# Patient Record
Sex: FEMALE | Race: WHITE | NOT HISPANIC OR LATINO | URBAN - METROPOLITAN AREA
[De-identification: names, ages, dates, MRNs, and addresses within clinical notes are randomized per-mention and may not be internally consistent; named-entity substitution may affect disease eponyms.]

---

## 2017-07-28 ENCOUNTER — EMERGENCY (EMERGENCY)
Facility: HOSPITAL | Age: 47
LOS: 1 days | Discharge: HOME | End: 2017-07-28
Attending: EMERGENCY MEDICINE
Payer: MEDICAID

## 2017-07-28 VITALS
DIASTOLIC BLOOD PRESSURE: 69 MMHG | WEIGHT: 149.91 LBS | HEIGHT: 64 IN | SYSTOLIC BLOOD PRESSURE: 112 MMHG | OXYGEN SATURATION: 98 % | RESPIRATION RATE: 20 BRPM | TEMPERATURE: 98 F | HEART RATE: 67 BPM

## 2017-07-28 DIAGNOSIS — G43.909 MIGRAINE, UNSPECIFIED, NOT INTRACTABLE, WITHOUT STATUS MIGRAINOSUS: ICD-10-CM

## 2017-07-28 DIAGNOSIS — R42 DIZZINESS AND GIDDINESS: ICD-10-CM

## 2017-07-28 DIAGNOSIS — Z88.0 ALLERGY STATUS TO PENICILLIN: ICD-10-CM

## 2017-07-28 DIAGNOSIS — E03.9 HYPOTHYROIDISM, UNSPECIFIED: ICD-10-CM

## 2017-07-28 PROCEDURE — 99284 EMERGENCY DEPT VISIT MOD MDM: CPT | Mod: 25

## 2017-07-28 RX ORDER — SODIUM CHLORIDE 9 MG/ML
1000 INJECTION INTRAMUSCULAR; INTRAVENOUS; SUBCUTANEOUS ONCE
Qty: 0 | Refills: 0 | Status: COMPLETED | OUTPATIENT
Start: 2017-07-28 | End: 2017-07-28

## 2017-07-28 RX ORDER — METOCLOPRAMIDE HCL 10 MG
10 TABLET ORAL ONCE
Qty: 0 | Refills: 0 | Status: COMPLETED | OUTPATIENT
Start: 2017-07-28 | End: 2017-07-28

## 2017-07-28 RX ORDER — MECLIZINE HCL 12.5 MG
25 TABLET ORAL ONCE
Qty: 0 | Refills: 0 | Status: COMPLETED | OUTPATIENT
Start: 2017-07-28 | End: 2017-07-28

## 2017-07-28 RX ORDER — DIPHENHYDRAMINE HCL 50 MG
25 CAPSULE ORAL ONCE
Qty: 0 | Refills: 0 | Status: COMPLETED | OUTPATIENT
Start: 2017-07-28 | End: 2017-07-28

## 2017-07-28 NOTE — ED PROVIDER NOTE - PROGRESS NOTE DETAILS
Endorsed to Dr. Luis, awaiting work up. labs unremarkable  on reeval patient reports resolution of HA after meds. Pt well-appearing, stable for discharge.

## 2017-07-28 NOTE — ED PROVIDER NOTE - OBJECTIVE STATEMENT
48 y/o F h/o migraine presenting for dizziness described as room spinning worse when patient lies down or with position change. Also reports nausea with vomiting.  Afebrile, no chest pain, no trauma, no abdominal pain.

## 2017-07-28 NOTE — ED PROVIDER NOTE - MEDICAL DECISION MAKING DETAILS
46 y/o F w headache and dizziness. Headache has been daily and feels similar to previous headaches, however today pt with dizziness, n/v. Neurologically intact w normal vitals. Labs, benadryl, reglan, meclizine, will reassess

## 2017-07-28 NOTE — ED PROVIDER NOTE - CARE PLAN
Principal Discharge DX:	Dizziness  Secondary Diagnosis:	Migraine without status migrainosus, not intractable, unspecified migraine type

## 2017-07-29 LAB
ANION GAP SERPL CALC-SCNC: 8 MMOL/L — SIGNIFICANT CHANGE UP (ref 5–17)
BASOPHILS # BLD AUTO: 0.1 K/UL — SIGNIFICANT CHANGE UP (ref 0–0.2)
BASOPHILS NFR BLD AUTO: 0.8 % — SIGNIFICANT CHANGE UP (ref 0–2)
BUN SERPL-MCNC: 9 MG/DL — SIGNIFICANT CHANGE UP (ref 7–18)
CALCIUM SERPL-MCNC: 8.6 MG/DL — SIGNIFICANT CHANGE UP (ref 8.4–10.5)
CHLORIDE SERPL-SCNC: 105 MMOL/L — SIGNIFICANT CHANGE UP (ref 96–108)
CO2 SERPL-SCNC: 28 MMOL/L — SIGNIFICANT CHANGE UP (ref 22–31)
CREAT SERPL-MCNC: 0.58 MG/DL — SIGNIFICANT CHANGE UP (ref 0.5–1.3)
EOSINOPHIL # BLD AUTO: 0.2 K/UL — SIGNIFICANT CHANGE UP (ref 0–0.5)
EOSINOPHIL NFR BLD AUTO: 2.3 % — SIGNIFICANT CHANGE UP (ref 0–6)
GLUCOSE SERPL-MCNC: 106 MG/DL — HIGH (ref 70–99)
HCT VFR BLD CALC: 37.5 % — SIGNIFICANT CHANGE UP (ref 34.5–45)
HGB BLD-MCNC: 12.8 G/DL — SIGNIFICANT CHANGE UP (ref 11.5–15.5)
LYMPHOCYTES # BLD AUTO: 1.5 K/UL — SIGNIFICANT CHANGE UP (ref 1–3.3)
LYMPHOCYTES # BLD AUTO: 16.5 % — SIGNIFICANT CHANGE UP (ref 13–44)
MCHC RBC-ENTMCNC: 30.6 PG — SIGNIFICANT CHANGE UP (ref 27–34)
MCHC RBC-ENTMCNC: 34.3 GM/DL — SIGNIFICANT CHANGE UP (ref 32–36)
MCV RBC AUTO: 89.1 FL — SIGNIFICANT CHANGE UP (ref 80–100)
MONOCYTES # BLD AUTO: 0.6 K/UL — SIGNIFICANT CHANGE UP (ref 0–0.9)
MONOCYTES NFR BLD AUTO: 6.6 % — SIGNIFICANT CHANGE UP (ref 2–14)
NEUTROPHILS # BLD AUTO: 6.7 K/UL — SIGNIFICANT CHANGE UP (ref 1.8–7.4)
NEUTROPHILS NFR BLD AUTO: 73.9 % — SIGNIFICANT CHANGE UP (ref 43–77)
PLATELET # BLD AUTO: 347 K/UL — SIGNIFICANT CHANGE UP (ref 150–400)
POTASSIUM SERPL-MCNC: 4.1 MMOL/L — SIGNIFICANT CHANGE UP (ref 3.5–5.3)
POTASSIUM SERPL-SCNC: 4.1 MMOL/L — SIGNIFICANT CHANGE UP (ref 3.5–5.3)
RBC # BLD: 4.21 M/UL — SIGNIFICANT CHANGE UP (ref 3.8–5.2)
RBC # FLD: 12.2 % — SIGNIFICANT CHANGE UP (ref 10.3–14.5)
SODIUM SERPL-SCNC: 141 MMOL/L — SIGNIFICANT CHANGE UP (ref 135–145)
WBC # BLD: 9 K/UL — SIGNIFICANT CHANGE UP (ref 3.8–10.5)
WBC # FLD AUTO: 9 K/UL — SIGNIFICANT CHANGE UP (ref 3.8–10.5)

## 2017-07-29 PROCEDURE — 36415 COLL VENOUS BLD VENIPUNCTURE: CPT

## 2017-07-29 PROCEDURE — 96374 THER/PROPH/DIAG INJ IV PUSH: CPT

## 2017-07-29 PROCEDURE — 85027 COMPLETE CBC AUTOMATED: CPT

## 2017-07-29 PROCEDURE — 96375 TX/PRO/DX INJ NEW DRUG ADDON: CPT

## 2017-07-29 PROCEDURE — 99284 EMERGENCY DEPT VISIT MOD MDM: CPT | Mod: 25

## 2017-07-29 PROCEDURE — 80048 BASIC METABOLIC PNL TOTAL CA: CPT

## 2017-07-29 RX ADMIN — SODIUM CHLORIDE 1000 MILLILITER(S): 9 INJECTION INTRAMUSCULAR; INTRAVENOUS; SUBCUTANEOUS at 01:32

## 2017-07-29 RX ADMIN — Medication 25 MILLIGRAM(S): at 01:33

## 2017-07-29 RX ADMIN — Medication 10 MILLIGRAM(S): at 01:32

## 2017-07-29 RX ADMIN — Medication 101 MILLIGRAM(S): at 01:32

## 2017-07-29 NOTE — ED ADULT NURSE NOTE - GENITOURINARY ASSESSMENT
Reason for visit: Annual     Last Pap: 16 Neg Hpv Neg  Last Mammo: 17 Benign  Last Dexa: none  Last Td/Tdap: 3/22/11  Gardasil: N/A  Colon: none  Lipids: 9/15/16 Chol 162 Tri 75 HDL 79 LDL 68  Obstetric History       T2      L3     SAB0   TAB0   Ectopic0   Molar0   Multiple0   Live Births3      History   Smoking Status   • Former Smoker   • Packs/day: 0.50   • Years: 10.00   • Types: Cigarettes   • Quit date: 10/15/2004   Smokeless Tobacco   • Never Used     C/o: Patient is here for her annual exam.  Patient is not using NuvaRing at this time. Patient has not had menses since December. Patient thought that menses would come after a while but still has not gotten it. She doesn't mind not having menses but thought she should mention that today. Patient is not currently having sex. Patient has no other concerns today.   Denies known Latex allergy or symptoms of Latex sensitivity.  Medications reviewed and updated.        WDL

## 2019-05-28 ENCOUNTER — TRANSCRIPTION ENCOUNTER (OUTPATIENT)
Age: 49
End: 2019-05-28

## 2022-10-23 ENCOUNTER — INPATIENT (INPATIENT)
Facility: HOSPITAL | Age: 52
LOS: 1 days | Discharge: HOME | DRG: 149 | End: 2022-10-25
Attending: HOSPITALIST | Admitting: HOSPITALIST
Payer: COMMERCIAL

## 2022-10-23 VITALS
OXYGEN SATURATION: 100 % | HEART RATE: 86 BPM | RESPIRATION RATE: 18 BRPM | SYSTOLIC BLOOD PRESSURE: 145 MMHG | TEMPERATURE: 98 F | HEIGHT: 64 IN | WEIGHT: 169.98 LBS | DIASTOLIC BLOOD PRESSURE: 78 MMHG

## 2022-10-23 DIAGNOSIS — Z90.49 ACQUIRED ABSENCE OF OTHER SPECIFIED PARTS OF DIGESTIVE TRACT: Chronic | ICD-10-CM

## 2022-10-23 DIAGNOSIS — R42 DIZZINESS AND GIDDINESS: ICD-10-CM

## 2022-10-23 DIAGNOSIS — Z29.9 ENCOUNTER FOR PROPHYLACTIC MEASURES, UNSPECIFIED: ICD-10-CM

## 2022-10-23 DIAGNOSIS — E03.9 HYPOTHYROIDISM, UNSPECIFIED: ICD-10-CM

## 2022-10-23 LAB
ALBUMIN SERPL ELPH-MCNC: 3.7 G/DL — SIGNIFICANT CHANGE UP (ref 3.5–5)
ALP SERPL-CCNC: 72 U/L — SIGNIFICANT CHANGE UP (ref 40–120)
ALT FLD-CCNC: 36 U/L DA — SIGNIFICANT CHANGE UP (ref 10–60)
ANION GAP SERPL CALC-SCNC: 12 MMOL/L — SIGNIFICANT CHANGE UP (ref 5–17)
AST SERPL-CCNC: 17 U/L — SIGNIFICANT CHANGE UP (ref 10–40)
BASOPHILS # BLD AUTO: 0.03 K/UL — SIGNIFICANT CHANGE UP (ref 0–0.2)
BASOPHILS NFR BLD AUTO: 0.2 % — SIGNIFICANT CHANGE UP (ref 0–2)
BILIRUB SERPL-MCNC: 0.3 MG/DL — SIGNIFICANT CHANGE UP (ref 0.2–1.2)
BUN SERPL-MCNC: 13 MG/DL — SIGNIFICANT CHANGE UP (ref 7–18)
CALCIUM SERPL-MCNC: 9.7 MG/DL — SIGNIFICANT CHANGE UP (ref 8.4–10.5)
CHLORIDE SERPL-SCNC: 106 MMOL/L — SIGNIFICANT CHANGE UP (ref 96–108)
CO2 SERPL-SCNC: 23 MMOL/L — SIGNIFICANT CHANGE UP (ref 22–31)
CREAT SERPL-MCNC: 0.72 MG/DL — SIGNIFICANT CHANGE UP (ref 0.5–1.3)
EGFR: 99 ML/MIN/1.73M2 — SIGNIFICANT CHANGE UP
EOSINOPHIL # BLD AUTO: 0.01 K/UL — SIGNIFICANT CHANGE UP (ref 0–0.5)
EOSINOPHIL NFR BLD AUTO: 0.1 % — SIGNIFICANT CHANGE UP (ref 0–6)
FLUAV AG NPH QL: SIGNIFICANT CHANGE UP
FLUBV AG NPH QL: SIGNIFICANT CHANGE UP
GLUCOSE SERPL-MCNC: 120 MG/DL — HIGH (ref 70–99)
HCT VFR BLD CALC: 43.4 % — SIGNIFICANT CHANGE UP (ref 34.5–45)
HGB BLD-MCNC: 14.3 G/DL — SIGNIFICANT CHANGE UP (ref 11.5–15.5)
IMM GRANULOCYTES NFR BLD AUTO: 0.4 % — SIGNIFICANT CHANGE UP (ref 0–0.9)
LYMPHOCYTES # BLD AUTO: 0.96 K/UL — LOW (ref 1–3.3)
LYMPHOCYTES # BLD AUTO: 6.7 % — LOW (ref 13–44)
MCHC RBC-ENTMCNC: 28.7 PG — SIGNIFICANT CHANGE UP (ref 27–34)
MCHC RBC-ENTMCNC: 32.9 GM/DL — SIGNIFICANT CHANGE UP (ref 32–36)
MCV RBC AUTO: 87 FL — SIGNIFICANT CHANGE UP (ref 80–100)
MONOCYTES # BLD AUTO: 0.27 K/UL — SIGNIFICANT CHANGE UP (ref 0–0.9)
MONOCYTES NFR BLD AUTO: 1.9 % — LOW (ref 2–14)
NEUTROPHILS # BLD AUTO: 12.99 K/UL — HIGH (ref 1.8–7.4)
NEUTROPHILS NFR BLD AUTO: 90.7 % — HIGH (ref 43–77)
NRBC # BLD: 0 /100 WBCS — SIGNIFICANT CHANGE UP (ref 0–0)
PLATELET # BLD AUTO: 412 K/UL — HIGH (ref 150–400)
POTASSIUM SERPL-MCNC: 3.7 MMOL/L — SIGNIFICANT CHANGE UP (ref 3.5–5.3)
POTASSIUM SERPL-SCNC: 3.7 MMOL/L — SIGNIFICANT CHANGE UP (ref 3.5–5.3)
PROT SERPL-MCNC: 8.5 G/DL — HIGH (ref 6–8.3)
RBC # BLD: 4.99 M/UL — SIGNIFICANT CHANGE UP (ref 3.8–5.2)
RBC # FLD: 13.3 % — SIGNIFICANT CHANGE UP (ref 10.3–14.5)
SARS-COV-2 RNA SPEC QL NAA+PROBE: SIGNIFICANT CHANGE UP
SODIUM SERPL-SCNC: 141 MMOL/L — SIGNIFICANT CHANGE UP (ref 135–145)
WBC # BLD: 14.32 K/UL — HIGH (ref 3.8–10.5)
WBC # FLD AUTO: 14.32 K/UL — HIGH (ref 3.8–10.5)

## 2022-10-23 PROCEDURE — 99233 SBSQ HOSP IP/OBS HIGH 50: CPT | Mod: GC

## 2022-10-23 PROCEDURE — 70450 CT HEAD/BRAIN W/O DYE: CPT | Mod: 26,MA

## 2022-10-23 PROCEDURE — 99285 EMERGENCY DEPT VISIT HI MDM: CPT

## 2022-10-23 RX ORDER — METOCLOPRAMIDE HCL 10 MG
10 TABLET ORAL ONCE
Refills: 0 | Status: COMPLETED | OUTPATIENT
Start: 2022-10-23 | End: 2022-10-23

## 2022-10-23 RX ORDER — ACETAMINOPHEN 500 MG
650 TABLET ORAL EVERY 6 HOURS
Refills: 0 | Status: DISCONTINUED | OUTPATIENT
Start: 2022-10-23 | End: 2022-10-25

## 2022-10-23 RX ORDER — SODIUM CHLORIDE 9 MG/ML
1000 INJECTION INTRAMUSCULAR; INTRAVENOUS; SUBCUTANEOUS ONCE
Refills: 0 | Status: COMPLETED | OUTPATIENT
Start: 2022-10-23 | End: 2022-10-23

## 2022-10-23 RX ORDER — MECLIZINE HCL 12.5 MG
25 TABLET ORAL EVERY 6 HOURS
Refills: 0 | Status: DISCONTINUED | OUTPATIENT
Start: 2022-10-23 | End: 2022-10-25

## 2022-10-23 RX ORDER — LANOLIN ALCOHOL/MO/W.PET/CERES
3 CREAM (GRAM) TOPICAL AT BEDTIME
Refills: 0 | Status: DISCONTINUED | OUTPATIENT
Start: 2022-10-23 | End: 2022-10-25

## 2022-10-23 RX ORDER — ONDANSETRON 8 MG/1
4 TABLET, FILM COATED ORAL EVERY 8 HOURS
Refills: 0 | Status: DISCONTINUED | OUTPATIENT
Start: 2022-10-23 | End: 2022-10-25

## 2022-10-23 RX ORDER — ONDANSETRON 8 MG/1
4 TABLET, FILM COATED ORAL ONCE
Refills: 0 | Status: COMPLETED | OUTPATIENT
Start: 2022-10-23 | End: 2022-10-23

## 2022-10-23 RX ORDER — LEVOTHYROXINE SODIUM 125 MCG
100 TABLET ORAL
Refills: 0 | Status: DISCONTINUED | OUTPATIENT
Start: 2022-10-23 | End: 2022-10-25

## 2022-10-23 RX ORDER — MECLIZINE HCL 12.5 MG
50 TABLET ORAL ONCE
Refills: 0 | Status: COMPLETED | OUTPATIENT
Start: 2022-10-23 | End: 2022-10-23

## 2022-10-23 RX ORDER — LEVOTHYROXINE SODIUM 125 MCG
88 TABLET ORAL
Refills: 0 | Status: DISCONTINUED | OUTPATIENT
Start: 2022-10-23 | End: 2022-10-25

## 2022-10-23 RX ORDER — SODIUM CHLORIDE 9 MG/ML
1000 INJECTION INTRAMUSCULAR; INTRAVENOUS; SUBCUTANEOUS
Refills: 0 | Status: DISCONTINUED | OUTPATIENT
Start: 2022-10-23 | End: 2022-10-25

## 2022-10-23 RX ORDER — ENOXAPARIN SODIUM 100 MG/ML
40 INJECTION SUBCUTANEOUS EVERY 24 HOURS
Refills: 0 | Status: DISCONTINUED | OUTPATIENT
Start: 2022-10-23 | End: 2022-10-25

## 2022-10-23 RX ORDER — ALBUTEROL 90 UG/1
2 AEROSOL, METERED ORAL EVERY 6 HOURS
Refills: 0 | Status: DISCONTINUED | OUTPATIENT
Start: 2022-10-23 | End: 2022-10-25

## 2022-10-23 RX ORDER — METOCLOPRAMIDE HCL 10 MG
1 TABLET ORAL
Qty: 0 | Refills: 0 | DISCHARGE

## 2022-10-23 RX ORDER — ALBUTEROL 90 UG/1
2 AEROSOL, METERED ORAL
Qty: 0 | Refills: 0 | DISCHARGE

## 2022-10-23 RX ORDER — DIAZEPAM 5 MG
2 TABLET ORAL ONCE
Refills: 0 | Status: DISCONTINUED | OUTPATIENT
Start: 2022-10-23 | End: 2022-10-23

## 2022-10-23 RX ADMIN — Medication 25 MILLIGRAM(S): at 17:53

## 2022-10-23 RX ADMIN — SODIUM CHLORIDE 75 MILLILITER(S): 9 INJECTION INTRAMUSCULAR; INTRAVENOUS; SUBCUTANEOUS at 17:54

## 2022-10-23 RX ADMIN — SODIUM CHLORIDE 1000 MILLILITER(S): 9 INJECTION INTRAMUSCULAR; INTRAVENOUS; SUBCUTANEOUS at 12:27

## 2022-10-23 RX ADMIN — Medication 50 MILLIGRAM(S): at 11:16

## 2022-10-23 RX ADMIN — Medication 10 MILLIGRAM(S): at 11:16

## 2022-10-23 RX ADMIN — Medication 50 MILLIGRAM(S): at 09:55

## 2022-10-23 RX ADMIN — Medication 2 MILLIGRAM(S): at 12:28

## 2022-10-23 RX ADMIN — ONDANSETRON 4 MILLIGRAM(S): 8 TABLET, FILM COATED ORAL at 10:18

## 2022-10-23 NOTE — H&P ADULT - NSHPPHYSICALEXAM_GEN_ALL_CORE
VITALS:   T(C): 36.8 (10-23-22 @ 15:33), Max: 36.8 (10-23-22 @ 09:05)  HR: 82 (10-23-22 @ 15:33) (82 - 88)  BP: 122/70 (10-23-22 @ 15:33) (122/70 - 159/79)  RR: 18 (10-23-22 @ 15:33) (18 - 18)  SpO2: 97% (10-23-22 @ 15:33) (97% - 100%)    GENERAL: NAD, lying in bed comfortably  HEAD:  Atraumatic, Normocephalic  EYES: EOMI, PERRLA, conjunctiva and sclera clear  ENT: Moist mucous membranes  NECK: Supple, No JVD  CHEST/LUNG: Clear to auscultation bilaterally; No rales, rhonchi, wheezing, or rubs. Unlabored respirations  HEART: Regular rate and rhythm; No murmurs, rubs, or gallops  ABDOMEN: BSx4; Soft, nontender, nondistended. Prior laparoscopic incisions, healed, non-erythematous  EXTREMITIES:  2+ Peripheral Pulses, brisk capillary refill. No clubbing, cyanosis, or edema  NERVOUS SYSTEM:  A&Ox3, no focal deficits   SKIN: No rashes or lesions

## 2022-10-23 NOTE — ED ADULT NURSE NOTE - OBJECTIVE STATEMENT
As per pt, c/o generalized weakness, lightheadedness, dizziness, and n/v since last night worsening this morning. Pt denies all other symptoms.

## 2022-10-23 NOTE — H&P ADULT - ASSESSMENT
52 year old female, PMH migraines, hypothyroidism, recent cholecystectomy (1 month ago), presents to the ED due to severe dizziness and nausea that started last night. Pt states she felt gradual onset of dizziness, described as room spinning. Pt admitted to medicine for new onset vertigo.

## 2022-10-23 NOTE — H&P ADULT - ATTENDING COMMENTS
Patient seen and examined. Case discussed with housestaff. In brief, this is a 53 yo F with chronic migraines, hypothyroidism who presents with acute onset of dizziness since this morning at 1am. She reports that she woke up so dizzy she could not even get up to go to the bathroom. The dizziness made her nauseous so she tried some Reglan but it did not help. The dizziness continued to get worse that she called her family who suggested that she come to the ED. Of note, patient reports that she had a hx of bronchitis about a week ago for which she was prescribed a Z-hannah and steroids, but she did not take them as she prefers hollistic remedies. She then developed pain in both ears b/L and was planning to see her PCP for it but did not get there prior to going to the ED. She denies any ringing in her ears. She reports sitting up and lying down make the dizziness worse. She has a slight nystagmus to the left on my exam but per the ED provider was more significant earlier. Will treat as possible BPPV vs. labrynthitis given recent viral infection. Will give IVF, standing meclizine, and monitor response. If no improvement will consider neurology evaluation. Remaining care as above.

## 2022-10-23 NOTE — ED PROVIDER NOTE - PHYSICAL EXAMINATION
Neuro: horizontal mastalgia to left, Hartland-Hallpike is negative, no dysmetria, normal finger to nose, no pronator drift

## 2022-10-23 NOTE — ED ADULT NURSE NOTE - CHPI ED NUR SYMPTOMS NEG
no blurred vision/no change in level of consciousness/no confusion/no fever/no loss of consciousness/no numbness

## 2022-10-23 NOTE — H&P ADULT - PROBLEM SELECTOR PLAN 1
- p/w new onset vertigo, associated with N/V  - states she had bronchitis last week and B/L ear pain that started 4 days ago  - CTH shows no acute abnormalities  - s/p IV Valium 2mg, meclizine 50mg x2, reglan and zofran x2 in ED  - ddx BPPV vs. vestibular neuritis - p/w new onset vertigo, associated with N/V  - states she had bronchitis last week and B/L ear pain that started 4 days ago  - CTH shows no acute abnormalities  - s/p IV Valium 2mg, meclizine 50mg x2, reglan and zofran x2 in ED  - ddx BPPV vs. vestibular neuritis vs. migraine-induced  - start meclizine 25mg q6 standing  - IV Zofran 4mg PRN for nausea  - start gentle - p/w new onset vertigo, associated with N/V  - states she had bronchitis last week and B/L ear pain that started 4 days ago  - CTH shows no acute abnormalities  - s/p IV Valium 2mg, meclizine 50mg x2, reglan and zofran x2 in ED  - ddx BPPV vs. vestibular neuritis vs. migraine-induced  - start meclizine 25mg q6 standing  - IV Zofran 4mg PRN for nausea  - start gentle hydration IVF NS @75cc  - f/u PT consult - p/w new onset vertigo, associated with N/V  - states she had bronchitis last week and B/L ear pain that started 4 days ago  - CTH shows no acute abnormalities  - s/p IV Valium 2mg, meclizine 50mg x2, reglan and zofran x2 in ED  - ddx BPPV vs. vestibular neuritis vs. migraine-induced  - start meclizine 25mg q6 standing  - IV Zofran 4mg PRN for nausea  - start gentle hydration IVF NS @75cc  - f/u PT consult  - pt could benefit from vestibular therapy as OP

## 2022-10-23 NOTE — ED PROVIDER NOTE - PROGRESS NOTE DETAILS
Pt had slight improvement after meclizine, but still is feeling too much spinning to stand up. Pt admitted for further care, CT head ordered.

## 2022-10-23 NOTE — H&P ADULT - NSHPREVIEWOFSYSTEMS_GEN_ALL_CORE
REVIEW OF SYSTEMS:    CONSTITUTIONAL: No weakness, fevers or chills  EYES/ENT: + vertigo. No visual changes;  No throat pain   NECK: No pain or stiffness  RESPIRATORY: No cough, wheezing, hemoptysis; No shortness of breath  CARDIOVASCULAR: No chest pain or palpitations  GASTROINTESTINAL: + nausea, vomiting. No abdominal or epigastric pain. No hematemesis; No diarrhea or constipation. No melena or hematochezia.  GENITOURINARY: No dysuria, frequency or hematuria  NEUROLOGICAL: No numbness or weakness  SKIN: No itching, rashes

## 2022-10-23 NOTE — ED ADULT NURSE NOTE - ED STAT RN HANDOFF DETAILS
Patient admitted to medicine in no acute distress , family at bedside endorsed to hold RN, patient to be transported via stretcher stable in no acute distress safety maintained.

## 2022-10-23 NOTE — ED PROVIDER NOTE - CLINICAL SUMMARY MEDICAL DECISION MAKING FREE TEXT BOX
Differentials: BPPV / no evidence of central vertigo  Plan: CBC, CMP, fluids, meclozine, red gland, and reassess.

## 2022-10-23 NOTE — PATIENT PROFILE ADULT - FALL HARM RISK - RISK INTERVENTIONS

## 2022-10-23 NOTE — H&P ADULT - PROBLEM SELECTOR PLAN 2
- hx of hypothyroidism, on levothyroxine 88mcg 5 days a week and 100mcg 2 times a week, per pharmacy  - resume home meds as prescribed  - f/u TSH

## 2022-10-23 NOTE — ED ADULT TRIAGE NOTE - CHIEF COMPLAINT QUOTE
PT REPORTS HEADACHE SINCE LAST NIGHT AND DIZZINESS SINCE 2 AM. + N/V. EMS REPORTS BP IN THE FIELD /100

## 2022-10-23 NOTE — PATIENT PROFILE ADULT - MONEY FOR FOOD
Ventricular Rate : 73   Atrial Rate : 75   P-R Interval : 130   QRS Duration : 93   Q-T Interval : 380   QTC Calculation(Melly) : 419   P Axis : 18   R Axis : 35   T Axis : 28   Diagnosis : -------------------- Pediatric ECG interpretation --------------------~Sinus rhythm~Normal ECG~~Chest Leads As Follows: V4R, V1, V2, V4, V5, V6~Confirmed by Adria COLES, Zbigniew ([id number]) on 9/10/2017 11:13:16 AM      no

## 2022-10-23 NOTE — H&P ADULT - HISTORY OF PRESENT ILLNESS
52 year old female, PMH migraines, hypothyroidism, recent cholecystectomy (1 month ago), presents to the ED due to severe dizziness and nausea that started last night. 52 year old female, PMH migraines, hypothyroidism, recent cholecystectomy (1 month ago), presents to the ED due to severe dizziness and nausea that started last night. Pt states she felt gradual onset of dizziness, described as room spinning, as well as nausea. It was so severe that she could not walk or move. She never had anything like this before. Pt states she vomited several times in the ED due to nausea. Of note, pt has chronic migraines, for which she receives botulism toxin injections. She did have minor migraine last night but has since gone away. Pt also states that 4-5 days ago she had B/L ear pain and last week she had bronchitis, with wet cough and mild congestion. States her cough is better now. She prefers to treat herself with natural remedies, thus was taking honey and multivitamin supplements. She also used albuterol inhaler. Her PCP prescribed her azithromycin but she did not take it. Otherwise she denies recent trauma, fevers, chills, chest pain, palpitations, sore throat, abdominal pain, diarrhea or dysuria.     In ED: vitals Temp 98.2F, HR 86, /78, 100% on RA  s/p IV Valium 2mg, meclizine 50mg x2, reglan and zofran x2  s/p 1L IVF NS bolus 52 year old female, PMH migraines, hypothyroidism, recent cholecystectomy (1 month ago), presents to the ED due to severe dizziness and nausea that started last night. Pt states she felt gradual onset of dizziness, described as room spinning, as well as nausea. It was so severe that she could not walk or move. She never had anything like this before. Pt states she vomited several times in the ED due to nausea. Of note, pt has chronic migraines, for which she receives botulism toxin injections. She did have minor migraine last night but has since gone away. Pt also states that 4-5 days ago she had B/L ear pain and last week she had bronchitis, with wet cough and mild congestion. States her cough is better now. She prefers to treat herself with natural remedies, thus was taking honey and multivitamin supplements. She also used albuterol inhaler. Her PCP prescribed her azithromycin but she did not take it. Otherwise she denies recent trauma, hearing changes, fevers, chills, chest pain, palpitations, sore throat, abdominal pain, diarrhea or dysuria.     In ED: vitals Temp 98.2F, HR 86, /78, 100% on RA  s/p IV Valium 2mg, meclizine 50mg x2, reglan and zofran x2 in ED  s/p 1L IVF NS bolus

## 2022-10-23 NOTE — ED PROVIDER NOTE - OBJECTIVE STATEMENT
52 year old Female with history of migraines presents to the ED with complaint of spinning sensation since last night. Patient reports her spinning sensation worsening when she moves her head to left. Patient has vomiting, but denies chest pain, shortness of breath, slurred speech, unilateral weakness, and ear pain. Patient is allergic to penicillin.

## 2022-10-24 DIAGNOSIS — Z02.9 ENCOUNTER FOR ADMINISTRATIVE EXAMINATIONS, UNSPECIFIED: ICD-10-CM

## 2022-10-24 DIAGNOSIS — H92.03 OTALGIA, BILATERAL: ICD-10-CM

## 2022-10-24 LAB
ANION GAP SERPL CALC-SCNC: 7 MMOL/L — SIGNIFICANT CHANGE UP (ref 5–17)
BUN SERPL-MCNC: 15 MG/DL — SIGNIFICANT CHANGE UP (ref 7–18)
CALCIUM SERPL-MCNC: 9.2 MG/DL — SIGNIFICANT CHANGE UP (ref 8.4–10.5)
CHLORIDE SERPL-SCNC: 109 MMOL/L — HIGH (ref 96–108)
CO2 SERPL-SCNC: 26 MMOL/L — SIGNIFICANT CHANGE UP (ref 22–31)
CREAT SERPL-MCNC: 0.69 MG/DL — SIGNIFICANT CHANGE UP (ref 0.5–1.3)
EGFR: 103 ML/MIN/1.73M2 — SIGNIFICANT CHANGE UP
GLUCOSE SERPL-MCNC: 105 MG/DL — HIGH (ref 70–99)
HCT VFR BLD CALC: 39.3 % — SIGNIFICANT CHANGE UP (ref 34.5–45)
HGB BLD-MCNC: 12.5 G/DL — SIGNIFICANT CHANGE UP (ref 11.5–15.5)
MAGNESIUM SERPL-MCNC: 2.3 MG/DL — SIGNIFICANT CHANGE UP (ref 1.6–2.6)
MCHC RBC-ENTMCNC: 28.2 PG — SIGNIFICANT CHANGE UP (ref 27–34)
MCHC RBC-ENTMCNC: 31.8 GM/DL — LOW (ref 32–36)
MCV RBC AUTO: 88.5 FL — SIGNIFICANT CHANGE UP (ref 80–100)
MRSA PCR RESULT.: SIGNIFICANT CHANGE UP
NRBC # BLD: 0 /100 WBCS — SIGNIFICANT CHANGE UP (ref 0–0)
PHOSPHATE SERPL-MCNC: 3.4 MG/DL — SIGNIFICANT CHANGE UP (ref 2.5–4.5)
PLATELET # BLD AUTO: 333 K/UL — SIGNIFICANT CHANGE UP (ref 150–400)
POTASSIUM SERPL-MCNC: 3.6 MMOL/L — SIGNIFICANT CHANGE UP (ref 3.5–5.3)
POTASSIUM SERPL-SCNC: 3.6 MMOL/L — SIGNIFICANT CHANGE UP (ref 3.5–5.3)
RBC # BLD: 4.44 M/UL — SIGNIFICANT CHANGE UP (ref 3.8–5.2)
RBC # FLD: 13.9 % — SIGNIFICANT CHANGE UP (ref 10.3–14.5)
S AUREUS DNA NOSE QL NAA+PROBE: SIGNIFICANT CHANGE UP
SODIUM SERPL-SCNC: 142 MMOL/L — SIGNIFICANT CHANGE UP (ref 135–145)
TSH SERPL-MCNC: 1.99 UU/ML — SIGNIFICANT CHANGE UP (ref 0.34–4.82)
WBC # BLD: 7.81 K/UL — SIGNIFICANT CHANGE UP (ref 3.8–10.5)
WBC # FLD AUTO: 7.81 K/UL — SIGNIFICANT CHANGE UP (ref 3.8–10.5)

## 2022-10-24 PROCEDURE — 99232 SBSQ HOSP IP/OBS MODERATE 35: CPT

## 2022-10-24 PROCEDURE — 99222 1ST HOSP IP/OBS MODERATE 55: CPT

## 2022-10-24 RX ORDER — CIPROFLOXACIN HCL 0.3 %
4 DROPS OPHTHALMIC (EYE)
Refills: 0 | Status: DISCONTINUED | OUTPATIENT
Start: 2022-10-24 | End: 2022-10-25

## 2022-10-24 RX ADMIN — Medication 88 MICROGRAM(S): at 05:54

## 2022-10-24 RX ADMIN — Medication 4 DROP(S): at 19:12

## 2022-10-24 RX ADMIN — Medication 25 MILLIGRAM(S): at 23:18

## 2022-10-24 RX ADMIN — Medication 25 MILLIGRAM(S): at 05:54

## 2022-10-24 RX ADMIN — Medication 25 MILLIGRAM(S): at 12:19

## 2022-10-24 RX ADMIN — Medication 25 MILLIGRAM(S): at 19:12

## 2022-10-24 RX ADMIN — Medication 1 TABLET(S): at 12:19

## 2022-10-24 NOTE — PROGRESS NOTE ADULT - PROBLEM SELECTOR PLAN 3
- DVT: Lovenox   PPI for GI Proph - hx of hypothyroidism, on levothyroxine 88mcg 5 days a week and 100mcg 2 times a week, per pharmacy  - resume home meds as prescribed  - TSH noted

## 2022-10-24 NOTE — PROGRESS NOTE ADULT - ASSESSMENT
52 year old female, PMH migraines, hypothyroidism, recent cholecystectomy (1 month ago), presents to the ED due to severe dizziness and nausea that started last night. Pt states she felt gradual onset of dizziness, described as room spinning. Pt admitted to medicine for new onset vertigo. 52 year old female, PMH migraines, hypothyroidism, recent cholecystectomy (1 month ago), presents to the ED due to severe dizziness and nausea that started last night. Pt states she felt gradual onset of dizziness, described as room spinning. Pt admitted to medicine for new onset vertigo.

## 2022-10-24 NOTE — PROGRESS NOTE ADULT - PROBLEM SELECTOR PLAN 1
- p/w new onset vertigo, associated with N/V, likely BPPV vs. vestibular neuritis vs. migraine-induced  - states she had bronchitis last week and B/L ear pain that started 4 days ago  - CTH: no acute abnormalities  - start meclizine 25mg q6 standing  - IV Zofran 4mg PRN for nausea  - gentle hydration IVF NS @75cc  - f/u PT consult  - pt could benefit from vestibular therapy as OP  -Neuro Consulted Dr. Barbosa

## 2022-10-24 NOTE — CONSULT NOTE ADULT - ASSESSMENT
Resolved episode of vertigo, nausea.  May have been first (known) episode of vertigo in association with a migraine attack.  Alternatively, she had an episode of BPPV (however there are no findings on exam at this time to support the Dx of BPPV).       Possibly lowered threshold for migraine recurrence due to postponing resumption of taking Aimovig after cholecystectomy.        RECOMMENDATIONS     Continue prescribed regimen of Aimovig.      Follow-up with her neurologist in ~the next 2 weeks.    Out-Pt ENT evaluation (hearing; vestibular function).      ESR, CRP, RF, CCP.      No further in-Pt neurologic evaluation or neuroimaging indicated at this time.  Defer to ENT and her neurologist for possible out-Pt studies.       Resolved episode of vertigo, nausea.  May have been first (known) episode of vertigo in association with a migraine attack.  Alternatively, she had an episode of BPPV (however there are no findings on exam at this time to support the Dx of BPPV).       Chronic cervical regional myofascial pain.  This can be a trigger for vertigo (cervicogenic vertigo) without or with nausea.         Possibly lowered threshold for migraine recurrence due to postponing resumption of taking Aimovig after cholecystectomy.        RECOMMENDATIONS     Continue prescribed regimen of Aimovig.      Follow-up with her neurologist in ~the next 2 weeks.    Out-Pt ENT evaluation (hearing; vestibular function).      ESR, CRP, RF, CCP (basic rheum screening tests).        No further in-Pt neurologic evaluation or neuroimaging indicated at this time.  Defer to ENT and her neurologist for possible out-Pt studies.

## 2022-10-24 NOTE — CONSULT NOTE ADULT - SUBJECTIVE AND OBJECTIVE BOX
History from Admission H&P yesterday    <Start of quote(s) from H&P>  "Reason for Admission: new onset vertigo  History of Present Illness:   52 year old female, PMH migraines, hypothyroidism, recent cholecystectomy (1 month ago), presents to the ED due to severe dizziness and nausea that started last night. Pt states she felt gradual onset of dizziness, described as room spinning, as well as nausea. It was so severe that she could not walk or move. She never had anything like this before. Pt states she vomited several times in the ED due to nausea. Of note, pt has chronic migraines, for which she receives botulism toxin injections. She did have minor migraine last night but has since gone away. Pt also states that 4-5 days ago she had B/L ear pain and last week she had bronchitis, with wet cough and mild congestion. States her cough is better now. She prefers to treat herself with natural remedies, thus was taking honey and multivitamin supplements. She also used albuterol inhaler. Her PCP prescribed her azithromycin but she did not take it. Otherwise she denies recent trauma, hearing changes, fevers, chills, chest pain, palpitations, sore throat, abdominal pain, diarrhea or dysuria.     In ED: vitals Temp 98.2F, HR 86, /78, 100% on RA  s/p IV Valium 2mg, meclizine 50mg x2, reglan and zofran x2 in ED  s/p 1L IVF NS bolus     Review of Systems:  Review of Systems: REVIEW OF SYSTEMS:    CONSTITUTIONAL: No weakness, fevers or chills  EYES/ENT: + vertigo. No visual changes;  No throat pain   NECK: No pain or stiffness  RESPIRATORY: No cough, wheezing, hemoptysis; No shortness of breath  CARDIOVASCULAR: No chest pain or palpitations  GASTROINTESTINAL: + nausea, vomiting. No abdominal or epigastric pain. No hematemesis; No diarrhea or constipation. No melena or hematochezia.  GENITOURINARY: No dysuria, frequency or hematuria  NEUROLOGICAL: No numbness or weakness  SKIN: No itching, rashes   . . .   Home Medications:   * Patient Currently Takes Medications as of 23-Oct-2022 15:45 documented in Structured Notes  · 	levothyroxine 88 mcg (0.088 mg) oral tablet: Last Dose Taken:  , 1 tab(s) orally 5 times a week  · 	levothyroxine 100 mcg (0.1 mg) oral tablet: Last Dose Taken:  , 1 tab(s) orally 2 times a week  · 	naproxen 500 mg oral tablet: Last Dose Taken:  , 1 tab(s) orally 2 times a day, As Needed  · 	Albuterol (Eqv-ProAir HFA) 90 mcg/inh inhalation aerosol: Last Dose Taken:  , 2 puff(s) inhaled every 6 hours, As Needed  · 	metoclopramide 5 mg oral tablet: Last Dose Taken:  , 1 tab(s) orally 4 times a day (before meals and at bedtime), As Needed  · 	magnesium oxide 400 mg oral tablet: Last Dose Taken:  , 1 tab(s) orally once a day  · 	benzonatate 200 mg oral capsule: Last Dose Taken:  , 1 cap(s) orally 3 times a day, As Needed    Patient History:    Past Medical, Past Surgical, and Family History:  PAST MEDICAL HISTORY:  Hypothyroidism   Migraine.     PAST SURGICAL HISTORY:  S/P cholecystectomy 1 month ago at [facility].     FAMILY HISTORY:   . . .    Social History:  · Substance use	No  · Social History (marital status, living situation, occupation, and sexual history)	Pt lives at home with her family. Denies alcohol, tobacco or recreational drug use.   . . .   · Has the patient used tobacco in the past 30 days?	No  <End of quote(s) from H&P>    On my interviewing the Pt I note in particular, or in addition to or instead of the above history, as follows:    Today she feels "100% better" as regards vertigo.      In addition to the above medications she is on Aimovig (erenumab) 140mg every three months (by autoinjector) and gets Botox injections (at 37 sites) every three months for migraine prophylaxis.  When first started on Aimovig she was taking 70 mg every 3 months, without benefit, therefore dose was increased (the medication comes in 70 mg autoinjectors).  She does not have motor, sensory or visual disturbances (no loss or darkening of vision, no true diplopia) in association with migraine episodes.  She has not previously had vertigo in association with migraine, or independently of headaches.  No tinnitus or hearing loss noted.  Her neurologist is Dr. Darren Tucker.      Underwent cholecystectomy 9/7/22.  It seems she thought her migraines had resolved after the operation and did not take Aimovig (kept in refrigerator at home) at what would have been the next scheduled time.  She did take it yesterday after EMS was called.  She has three children, of whom one (son present at bedside) also has migraines.      Non-con head CT: normal study.      EXAMINATION    Awake, alert.  Medium build.  In no apparent distress.  Very good command of English; her son occasionally assists in translating from Maur[place].  PERRL; EOMs conjugate and full; smooth pursuit.  No nystagmus.  Positional maneuvers do not elicit vertigo or nystagmus.  Normal facial/lingual movements.  No UE drift.  David of four limbs normal and symmetric.    Normal symmetric strngth on confrontation testing of limb muscles.  Gait and station normal.  Walks normallyu on toes and heels.  P/LT sensation intact; no extinction on DSS.    Reflex                           Right    Left   Comment    Biceps                              2        2  Triceps                             1        1  Brachiorad                        2-       2-  [place]                      absent   absent  Hip add                             1       0  Patellar                          jumps  jumps             Gastroc                             2        2  Plantar                            mute  flexor      History from Admission H&P yesterday    <Start of quote(s) from H&P>  "Reason for Admission: new onset vertigo  History of Present Illness:   52 year old female, PMH migraines, hypothyroidism, recent cholecystectomy (1 month ago), presents to the ED due to severe dizziness and nausea that started last night. Pt states she felt gradual onset of dizziness, described as room spinning, as well as nausea. It was so severe that she could not walk or move. She never had anything like this before. Pt states she vomited several times in the ED due to nausea. Of note, pt has chronic migraines, for which she receives botulism toxin injections. She did have minor migraine last night but has since gone away. Pt also states that 4-5 days ago she had B/L ear pain and last week she had bronchitis, with wet cough and mild congestion. States her cough is better now. She prefers to treat herself with natural remedies, thus was taking honey and multivitamin supplements. She also used albuterol inhaler. Her PCP prescribed her azithromycin but she did not take it. Otherwise she denies recent trauma, hearing changes, fevers, chills, chest pain, palpitations, sore throat, abdominal pain, diarrhea or dysuria.     In ED: vitals Temp 98.2F, HR 86, /78, 100% on RA  s/p IV Valium 2mg, meclizine 50mg x2, reglan and zofran x2 in ED  s/p 1L IVF NS bolus     Review of Systems:  Review of Systems: REVIEW OF SYSTEMS:    CONSTITUTIONAL: No weakness, fevers or chills  EYES/ENT: + vertigo. No visual changes;  No throat pain   NECK: No pain or stiffness  RESPIRATORY: No cough, wheezing, hemoptysis; No shortness of breath  CARDIOVASCULAR: No chest pain or palpitations  GASTROINTESTINAL: + nausea, vomiting. No abdominal or epigastric pain. No hematemesis; No diarrhea or constipation. No melena or hematochezia.  GENITOURINARY: No dysuria, frequency or hematuria  NEUROLOGICAL: No numbness or weakness  SKIN: No itching, rashes   . . .   Home Medications:   * Patient Currently Takes Medications as of 23-Oct-2022 15:45 documented in Structured Notes  · 	levothyroxine 88 mcg (0.088 mg) oral tablet: Last Dose Taken:  , 1 tab(s) orally 5 times a week  · 	levothyroxine 100 mcg (0.1 mg) oral tablet: Last Dose Taken:  , 1 tab(s) orally 2 times a week  · 	naproxen 500 mg oral tablet: Last Dose Taken:  , 1 tab(s) orally 2 times a day, As Needed  · 	Albuterol (Eqv-ProAir HFA) 90 mcg/inh inhalation aerosol: Last Dose Taken:  , 2 puff(s) inhaled every 6 hours, As Needed  · 	metoclopramide 5 mg oral tablet: Last Dose Taken:  , 1 tab(s) orally 4 times a day (before meals and at bedtime), As Needed  · 	magnesium oxide 400 mg oral tablet: Last Dose Taken:  , 1 tab(s) orally once a day  · 	benzonatate 200 mg oral capsule: Last Dose Taken:  , 1 cap(s) orally 3 times a day, As Needed    Patient History:    Past Medical, Past Surgical, and Family History:  PAST MEDICAL HISTORY:  Hypothyroidism   Migraine.     PAST SURGICAL HISTORY:  S/P cholecystectomy 1 month ago at [facility].     FAMILY HISTORY:   . . .    Social History:  · Substance use	No  · Social History (marital status, living situation, occupation, and sexual history)	Pt lives at home with her family. Denies alcohol, tobacco or recreational drug use.   . . .   · Has the patient used tobacco in the past 30 days?	No  <End of quote(s) from H&P>    On my interviewing the Pt I note in particular, or in addition to or instead of the above history, as follows:    Today she feels "100% better" as regards vertigo.  She describes having had a sensation of spinning, in addition to nausea; also had diaphoresis at home prior to EMS being called.      In addition to the above medications she is on Aimovig (erenumab) 140mg every three months (by autoinjector) and gets Botox injections (at 37 sites) every three months for migraine prophylaxis.  When first started on Aimovig she was taking 70 mg every 3 months, without benefit, therefore dose was increased (the medication comes in 70 mg autoinjectors).  She does not have motor, sensory or visual disturbances (no loss or darkening of vision, no true diplopia) in association with migraine episodes.  In the past she has had nausea and vomiting in association with migraine but has not previously had vertigo, neither in association with migraine, nor independently of headaches.  She has not noted tinnitus or hearing loss.  Her neurologist is Dr. Darren uTcker.      Underwent cholecystectomy 9/7/22.  It seems she thought her migraines had resolved after the operation and did not take Aimovig (kept in refrigerator at home) at what would have been the next scheduled time.  She did take it yesterday after EMS was called.  She has three children, of whom one (son present at bedside) also has migraines.      Non-con head CT: normal study.        EXAMINATION    Awake, alert.  Medium build.  In no apparent distress.  Very good command of English; her son occasionally assists in translating from [place]onian.  PERRL; EOMs conjugate and full; smooth pursuit.  No nystagmus.  Positional maneuvers do not elicit vertigo or nystagmus.  hearing grossly intact to finger rub.  Normal facial/lingual movements.  No UE drift.  David of four limbs normal and symmetric.    Normal symmetric strength on confrontation testing of limb muscles.  Gait and station normal.  Walks normally on toes and heels.  P/LT sensation intact; no extinction on DSS.    Reflex                           Right    Left   Comment    Biceps                              2        2  Triceps                             1        1  Brachiorad                        2-       2-  [place]                      absent   absent  Hip add                             1       0  Patellar                          jumps  jumps             Gastroc                             2        2  Plantar                            mute  flexor     Non-radiating mild tenderness to palpation of trapezii, SCMs, scalp.  No trigger points encountered.      FNF intact.   History from Admission H&P yesterday    <Start of quote(s) from H&P>  "Reason for Admission: new onset vertigo  History of Present Illness:   52 year old female, PMH migraines, hypothyroidism, recent cholecystectomy (1 month ago), presents to the ED due to severe dizziness and nausea that started last night. Pt states she felt gradual onset of dizziness, described as room spinning, as well as nausea. It was so severe that she could not walk or move. She never had anything like this before. Pt states she vomited several times in the ED due to nausea. Of note, pt has chronic migraines, for which she receives botulism toxin injections. She did have minor migraine last night but has since gone away. Pt also states that 4-5 days ago she had B/L ear pain and last week she had bronchitis, with wet cough and mild congestion. States her cough is better now. She prefers to treat herself with natural remedies, thus was taking honey and multivitamin supplements. She also used albuterol inhaler. Her PCP prescribed her azithromycin but she did not take it. Otherwise she denies recent trauma, hearing changes, fevers, chills, chest pain, palpitations, sore throat, abdominal pain, diarrhea or dysuria.     In ED: vitals Temp 98.2F, HR 86, /78, 100% on RA  s/p IV Valium 2mg, meclizine 50mg x2, reglan and zofran x2 in ED  s/p 1L IVF NS bolus     Review of Systems:  Review of Systems: REVIEW OF SYSTEMS:    CONSTITUTIONAL: No weakness, fevers or chills  EYES/ENT: + vertigo. No visual changes;  No throat pain   NECK: No pain or stiffness  RESPIRATORY: No cough, wheezing, hemoptysis; No shortness of breath  CARDIOVASCULAR: No chest pain or palpitations  GASTROINTESTINAL: + nausea, vomiting. No abdominal or epigastric pain. No hematemesis; No diarrhea or constipation. No melena or hematochezia.  GENITOURINARY: No dysuria, frequency or hematuria  NEUROLOGICAL: No numbness or weakness  SKIN: No itching, rashes   . . .   Home Medications:   * Patient Currently Takes Medications as of 23-Oct-2022 15:45 documented in Structured Notes  · 	levothyroxine 88 mcg (0.088 mg) oral tablet: Last Dose Taken:  , 1 tab(s) orally 5 times a week  · 	levothyroxine 100 mcg (0.1 mg) oral tablet: Last Dose Taken:  , 1 tab(s) orally 2 times a week  · 	naproxen 500 mg oral tablet: Last Dose Taken:  , 1 tab(s) orally 2 times a day, As Needed  · 	Albuterol (Eqv-ProAir HFA) 90 mcg/inh inhalation aerosol: Last Dose Taken:  , 2 puff(s) inhaled every 6 hours, As Needed  · 	metoclopramide 5 mg oral tablet: Last Dose Taken:  , 1 tab(s) orally 4 times a day (before meals and at bedtime), As Needed  · 	magnesium oxide 400 mg oral tablet: Last Dose Taken:  , 1 tab(s) orally once a day  · 	benzonatate 200 mg oral capsule: Last Dose Taken:  , 1 cap(s) orally 3 times a day, As Needed    Patient History:    Past Medical, Past Surgical, and Family History:  PAST MEDICAL HISTORY:  Hypothyroidism   Migraine.     PAST SURGICAL HISTORY:  S/P cholecystectomy 1 month ago at [facility].     FAMILY HISTORY:   . . .    Social History:  · Substance use	No  · Social History (marital status, living situation, occupation, and sexual history)	Pt lives at home with her family. Denies alcohol, tobacco or recreational drug use.   . . .   · Has the patient used tobacco in the past 30 days?	No  <End of quote(s) from H&P>    On my interviewing the Pt I note in particular, or in addition to or instead of the above history, as follows:    Today she feels "100% better" as regards vertigo.  She describes having had a sensation of spinning, in addition to nausea; also had diaphoresis at home prior to EMS being called.      In addition to the above medications she is on Aimovig (erenumab) 140mg every three months (by autoinjector) and gets Botox injections (at 37 sites) every three months for migraine prophylaxis.  When first started on Aimovig she was taking 70 mg every 3 months, without benefit, therefore dose was increased (the medication comes in 70 mg autoinjectors).  She does not have motor, sensory or visual disturbances (no loss or darkening of vision, no true diplopia) in association with migraine episodes.  In the past she has had nausea and vomiting in association with migraine but has not previously had vertigo, neither in association with migraine, nor independently of headaches.  She has not noted tinnitus or hearing loss.  Chronic neck/trapezius pain, bilateral.   Her neurologist is Dr. Darren Tucker.      Underwent cholecystectomy 9/7/22.  It seems she thought her migraines had resolved after the operation and did not take Aimovig (kept in refrigerator at home) at what would have been the next scheduled time.  She did take it yesterday after EMS was called.  She has three children, of whom one (son present at bedside) also has migraines.      Non-con head CT: normal study.        EXAMINATION    Awake, alert.  Medium build.  In no apparent distress.  Very good command of English; her son occasionally assists in translating from [place]yan.  PERRL; EOMs conjugate and full; smooth pursuit.  No nystagmus.  Positional maneuvers do not elicit vertigo or nystagmus.  hearing grossly intact to finger rub.  Normal facial/lingual movements.  No UE drift.  David of four limbs normal and symmetric.    Normal symmetric strength on confrontation testing of limb muscles.  Gait and station normal.  Walks normally on toes and heels.  P/LT sensation intact; no extinction on DSS.    Reflex                           Right    Left   Comment    Biceps                              2        2  Triceps                             1        1  Brachiorad                        2-       2-  [place]                      absent   absent  Hip add                             1       0  Patellar                          jumps, apparently due to pain             Gastroc                             2        2  Plantar                            mute  flexor     Non-radiating mild tenderness to palpation of trapezii, SCMs, scalp.  No trigger points encountered.      FNF intact.

## 2022-10-24 NOTE — PROGRESS NOTE ADULT - NS ATTEND AMEND GEN_ALL_CORE FT
Patient seen and examined on 10/24/22. Case discussed with NP Masoud. Communicated with patient via [place]  #[id number], [place]. Patient reports feeling improved from yesterday but still some dizziness and overall generalized weakness. On exam, patient is able to stand up and move to the bed to have her ears examined. Otoscopic examination was performed and no significant findings were seen but patient continues to complain of significant pain in her ears. Will trial Cipro drops x 7 days. Orthostatic BP's are negative. PT recommending home. Likely dc in the AM. Remaining care as noted above. Patient seen and examined on 10/24/22. This is a late entry. Case discussed with NP Masoud. Communicated with patient via [place]  #[id number], [place]. Patient reports feeling improved from yesterday but still some dizziness and overall generalized weakness. On exam, patient is able to stand up and move to the bed to have her ears examined. Otoscopic examination was performed and no significant findings were seen but patient continues to complain of significant pain in her ears. Will trial Cipro drops x 7 days. Orthostatic BP's are negative. PT recommending home. Likely dc in the AM. Remaining care as noted above.

## 2022-10-24 NOTE — PROGRESS NOTE ADULT - SUBJECTIVE AND OBJECTIVE BOX
NP Note discussed with  Primary Attending    Patient is a 52y old  Female who presents with a chief complaint of new onset vertigo (23 Oct 2022 15:39)      INTERVAL HPI/OVERNIGHT EVENTS: no new complaints.    MEDICATIONS  (STANDING):  ciprofloxacin  0.3% Otic Solution 4 Drop(s) Both Ears two times a day  enoxaparin Injectable 40 milliGRAM(s) SubCutaneous every 24 hours  levothyroxine 88 MICROGram(s) Oral <User Schedule>  levothyroxine 100 MICROGram(s) Oral <User Schedule>  meclizine 25 milliGRAM(s) Oral every 6 hours  multivitamin 1 Tablet(s) Oral daily  sodium chloride 0.9%. 1000 milliLiter(s) (75 mL/Hr) IV Continuous <Continuous>    MEDICATIONS  (PRN):  acetaminophen     Tablet .. 650 milliGRAM(s) Oral every 6 hours PRN Temp greater or equal to 38C (100.4F), Mild Pain (1 - 3)  ALBUTerol    90 MICROgram(s) HFA Inhaler 2 Puff(s) Inhalation every 6 hours PRN Shortness of Breath and/or Wheezing  aluminum hydroxide/magnesium hydroxide/simethicone Suspension 30 milliLiter(s) Oral every 4 hours PRN Dyspepsia  melatonin 3 milliGRAM(s) Oral at bedtime PRN Insomnia  ondansetron Injectable 4 milliGRAM(s) IV Push every 8 hours PRN Nausea and/or Vomiting      __________________________________________________  REVIEW OF SYSTEMS:    CONSTITUTIONAL: No fever, +generalized weakness, +intermittent dizziness (room spinning) +right ear pain   EYES: no acute visual disturbances  NECK: No pain or stiffness  RESPIRATORY: No cough; No shortness of breath  CARDIOVASCULAR: No chest pain, no palpitations  GASTROINTESTINAL: No pain. No nausea or vomiting; No diarrhea   NEUROLOGICAL: No headache or numbness, no tremors  MUSCULOSKELETAL: No joint pain, no muscle pain  GENITOURINARY: no dysuria, no frequency, no hesitancy  PSYCHIATRY: no depression , no anxiety  ALL OTHER  ROS negative        Vital Signs Last 24 Hrs  T(C): 37.3 (24 Oct 2022 05:28), Max: 37.3 (24 Oct 2022 05:28)  T(F): 99.2 (24 Oct 2022 05:28), Max: 99.2 (24 Oct 2022 05:28)  HR: 56 (24 Oct 2022 05:28) (56 - 88)  BP: 98/51 (24 Oct 2022 05:28) (98/51 - 159/79)  BP(mean): --  RR: 18 (24 Oct 2022 05:28) (18 - 18)  SpO2: 98% (24 Oct 2022 05:28) (97% - 98%)    Parameters below as of 24 Oct 2022 05:28  Patient On (Oxygen Delivery Method): room air        ________________________________________________  PHYSICAL EXAM:  GENERAL: NAD  HEENT: Normocephalic;  conjunctivae and sclerae clear; moist mucous membranes;   NECK : supple  CHEST/LUNG: Clear to auscultation bilaterally with good air entry   HEART: S1 S2  regular; no murmurs, gallops or rubs  ABDOMEN: Soft, Nontender, Nondistended; Bowel sounds present  EXTREMITIES: no cyanosis; no edema; no calf tenderness  SKIN: warm and dry; no rash  NERVOUS SYSTEM:  Awake and alert; Oriented  to place, person and time ; no new deficits    _________________________________________________  LABS:                        12.5   7.81  )-----------( 333      ( 24 Oct 2022 06:42 )             39.3     10-24    142  |  109<H>  |  15  ----------------------------<  105<H>  3.6   |  26  |  0.69    Ca    9.2      24 Oct 2022 06:42  Phos  3.4     10-24  Mg     2.3     10-24    TPro  8.5<H>  /  Alb  3.7  /  TBili  0.3  /  DBili  x   /  AST  17  /  ALT  36  /  AlkPhos  72  10-23        CAPILLARY BLOOD GLUCOSE    RADIOLOGY & ADDITIONAL TESTS:  < from: CT Head No Cont (10.23.22 @ 14:06) >    ACC: [id number] EXAM:  CT BRAIN                          PROCEDURE DATE:  10/23/2022          INTERPRETATION:  HISTORY: Vertigo.    COMPARISON: CT head 6/6/2014    TECHNIQUE: Axial noncontrast CT images from the skull base to the vertex   were obtained and submitted for interpretation. Coronal and sagittal   reformatted images were performed. Bone and soft tissue windows were   evaluated.    FINDINGS:    There is no acute intracranial mass-effect, hemorrhage, midline shift, or   abnormal extra-axial fluid collection. Gray-white differentiation is   maintained.    Ventricles, sulci, and cisterns are normal in size for the patient's age   without hydrocephalus. Basal cisterns are patent.    Visualized paranasal sinuses and mastoid air cells areclear. Calvarium   is intact.    IMPRESSION:    No acute intracranial bleeding.    --- End of Report ---      DIXON MORRIS MD; Attending Radiologist  This document has been electronically signed. Oct 23 2022  2:11PM    < end of copied text >    Imaging  Reviewed:  YES     Consultant(s) Notes Reviewed:   YES     Plan of care was discussed with patient and /or primary care giver; all questions and concerns were addressed

## 2022-10-24 NOTE — PROGRESS NOTE ADULT - PROBLEM SELECTOR PLAN 2
- hx of hypothyroidism, on levothyroxine 88mcg 5 days a week and 100mcg 2 times a week, per pharmacy  - resume home meds as prescribed  - TSH noted -B/L ear pain that started 4 days ago  -no tx prior to admission  -started on Cipro 4gtt bilateral ears BID x 4D

## 2022-10-25 ENCOUNTER — TRANSCRIPTION ENCOUNTER (OUTPATIENT)
Age: 52
End: 2022-10-25

## 2022-10-25 VITALS
DIASTOLIC BLOOD PRESSURE: 72 MMHG | RESPIRATION RATE: 18 BRPM | TEMPERATURE: 99 F | OXYGEN SATURATION: 99 % | SYSTOLIC BLOOD PRESSURE: 131 MMHG | HEART RATE: 77 BPM

## 2022-10-25 LAB
ANION GAP SERPL CALC-SCNC: 7 MMOL/L — SIGNIFICANT CHANGE UP (ref 5–17)
BUN SERPL-MCNC: 20 MG/DL — HIGH (ref 7–18)
CALCIUM SERPL-MCNC: 9.7 MG/DL — SIGNIFICANT CHANGE UP (ref 8.4–10.5)
CHLORIDE SERPL-SCNC: 106 MMOL/L — SIGNIFICANT CHANGE UP (ref 96–108)
CO2 SERPL-SCNC: 28 MMOL/L — SIGNIFICANT CHANGE UP (ref 22–31)
CREAT SERPL-MCNC: 0.66 MG/DL — SIGNIFICANT CHANGE UP (ref 0.5–1.3)
EGFR: 104 ML/MIN/1.73M2 — SIGNIFICANT CHANGE UP
GLUCOSE SERPL-MCNC: 100 MG/DL — HIGH (ref 70–99)
HCT VFR BLD CALC: 44.8 % — SIGNIFICANT CHANGE UP (ref 34.5–45)
HGB BLD-MCNC: 14.4 G/DL — SIGNIFICANT CHANGE UP (ref 11.5–15.5)
MCHC RBC-ENTMCNC: 28.4 PG — SIGNIFICANT CHANGE UP (ref 27–34)
MCHC RBC-ENTMCNC: 32.1 GM/DL — SIGNIFICANT CHANGE UP (ref 32–36)
MCV RBC AUTO: 88.4 FL — SIGNIFICANT CHANGE UP (ref 80–100)
NRBC # BLD: 0 /100 WBCS — SIGNIFICANT CHANGE UP (ref 0–0)
PLATELET # BLD AUTO: 391 K/UL — SIGNIFICANT CHANGE UP (ref 150–400)
POTASSIUM SERPL-MCNC: 4 MMOL/L — SIGNIFICANT CHANGE UP (ref 3.5–5.3)
POTASSIUM SERPL-SCNC: 4 MMOL/L — SIGNIFICANT CHANGE UP (ref 3.5–5.3)
RBC # BLD: 5.07 M/UL — SIGNIFICANT CHANGE UP (ref 3.8–5.2)
RBC # FLD: 13.7 % — SIGNIFICANT CHANGE UP (ref 10.3–14.5)
SODIUM SERPL-SCNC: 141 MMOL/L — SIGNIFICANT CHANGE UP (ref 135–145)
WBC # BLD: 7.42 K/UL — SIGNIFICANT CHANGE UP (ref 3.8–10.5)
WBC # FLD AUTO: 7.42 K/UL — SIGNIFICANT CHANGE UP (ref 3.8–10.5)

## 2022-10-25 PROCEDURE — G0378: CPT

## 2022-10-25 PROCEDURE — 97161 PT EVAL LOW COMPLEX 20 MIN: CPT

## 2022-10-25 PROCEDURE — 80048 BASIC METABOLIC PNL TOTAL CA: CPT

## 2022-10-25 PROCEDURE — 99285 EMERGENCY DEPT VISIT HI MDM: CPT

## 2022-10-25 PROCEDURE — 82962 GLUCOSE BLOOD TEST: CPT

## 2022-10-25 PROCEDURE — 36415 COLL VENOUS BLD VENIPUNCTURE: CPT

## 2022-10-25 PROCEDURE — 87641 MR-STAPH DNA AMP PROBE: CPT

## 2022-10-25 PROCEDURE — 87640 STAPH A DNA AMP PROBE: CPT

## 2022-10-25 PROCEDURE — 96374 THER/PROPH/DIAG INJ IV PUSH: CPT

## 2022-10-25 PROCEDURE — 84100 ASSAY OF PHOSPHORUS: CPT

## 2022-10-25 PROCEDURE — 83735 ASSAY OF MAGNESIUM: CPT

## 2022-10-25 PROCEDURE — 85025 COMPLETE CBC W/AUTO DIFF WBC: CPT

## 2022-10-25 PROCEDURE — 70450 CT HEAD/BRAIN W/O DYE: CPT | Mod: MA

## 2022-10-25 PROCEDURE — 87637 SARSCOV2&INF A&B&RSV AMP PRB: CPT

## 2022-10-25 PROCEDURE — 84443 ASSAY THYROID STIM HORMONE: CPT

## 2022-10-25 PROCEDURE — 99238 HOSP IP/OBS DSCHRG MGMT 30/<: CPT

## 2022-10-25 PROCEDURE — 96375 TX/PRO/DX INJ NEW DRUG ADDON: CPT

## 2022-10-25 PROCEDURE — 80053 COMPREHEN METABOLIC PANEL: CPT

## 2022-10-25 PROCEDURE — 93005 ELECTROCARDIOGRAM TRACING: CPT

## 2022-10-25 PROCEDURE — 85027 COMPLETE CBC AUTOMATED: CPT

## 2022-10-25 RX ORDER — CIPROFLOXACIN AND DEXAMETHASONE 3; 1 MG/ML; MG/ML
4 SUSPENSION/ DROPS AURICULAR (OTIC)
Qty: 56 | Refills: 0
Start: 2022-10-25 | End: 2022-10-31

## 2022-10-25 RX ORDER — MAGNESIUM OXIDE 400 MG ORAL TABLET 241.3 MG
1 TABLET ORAL
Qty: 0 | Refills: 0 | DISCHARGE

## 2022-10-25 RX ORDER — LEVOTHYROXINE SODIUM 125 MCG
1 TABLET ORAL
Qty: 0 | Refills: 0 | DISCHARGE

## 2022-10-25 RX ORDER — MECLIZINE HCL 12.5 MG
1 TABLET ORAL
Qty: 40 | Refills: 0
Start: 2022-10-25 | End: 2022-11-03

## 2022-10-25 RX ORDER — MAGNESIUM OXIDE 400 MG ORAL TABLET 241.3 MG
1 TABLET ORAL
Qty: 30 | Refills: 0
Start: 2022-10-25 | End: 2022-11-23

## 2022-10-25 RX ORDER — LEVOTHYROXINE SODIUM 125 MCG
1 TABLET ORAL
Qty: 9 | Refills: 0
Start: 2022-10-25 | End: 2022-11-23

## 2022-10-25 RX ORDER — LEVOTHYROXINE SODIUM 125 MCG
1 TABLET ORAL
Qty: 21 | Refills: 0
Start: 2022-10-25 | End: 2022-11-23

## 2022-10-25 RX ADMIN — Medication 25 MILLIGRAM(S): at 12:29

## 2022-10-25 RX ADMIN — Medication 88 MICROGRAM(S): at 05:38

## 2022-10-25 RX ADMIN — Medication 25 MILLIGRAM(S): at 05:39

## 2022-10-25 RX ADMIN — Medication 1 TABLET(S): at 12:29

## 2022-10-25 RX ADMIN — Medication 4 DROP(S): at 05:38

## 2022-10-25 NOTE — DISCHARGE NOTE NURSING/CASE MANAGEMENT/SOCIAL WORK - NSDCPEFALRISK_GEN_ALL_CORE
For information on Fall & Injury Prevention, visit: [email or web address] OR  [email or web address] OR  https://www.cdc.gov/steadi/patient.html

## 2022-10-25 NOTE — DISCHARGE NOTE PROVIDER - PROVIDER TOKENS
PROVIDER:[TOKEN:[[id number]],FOLLOWUP:[2 weeks],ESTABLISHEDPATIENT:[T]],FREE:[LAST:[harris],FIRST:[darrius],PHONE:[phone]],FAX:[(   )    -],ADDRESS:[[address]],FOLLOWUP:[2 weeks],ESTABLISHEDPATIENT:[T]]

## 2022-10-25 NOTE — DISCHARGE NOTE PROVIDER - CARE PROVIDER_API CALL
Jerry Guillen)  Internal Medicine  [id number] [place] [address]  Phone: [phone]  Fax: [phone]  Established Patient  Follow Up Time: 2 weeks    darrius iglesias  Neurology   [address] [place] [address]  Phone: [phone]  Fax: (   )    -  Established Patient  Follow Up Time: 2 weeks

## 2022-10-25 NOTE — DISCHARGE NOTE PROVIDER - ATTENDING DISCHARGE PHYSICAL EXAMINATION:
Patient is alert and cooperative.  Feels well.  Vital Signs Last 24 Hrs  T(C): 37 (25 Oct 2022 12:30), Max: 37 (25 Oct 2022 12:30)  T(F): 98.6 (25 Oct 2022 12:30), Max: 98.6 (25 Oct 2022 12:30)  HR: 77 (25 Oct 2022 12:30) (53 - 77)  BP: 131/72 (25 Oct 2022 12:30) (107/59 - 131/72)  BP(mean): --  RR: 18 (25 Oct 2022 12:30) (17 - 18)  SpO2: 99% (25 Oct 2022 12:30) (99% - 100%)    Parameters below as of 25 Oct 2022 12:30  Patient On (Oxygen Delivery Method): room air  Lungs, clear  Cor, RRR  Abdomen, soft  Neurological, intact

## 2022-10-25 NOTE — DISCHARGE NOTE PROVIDER - NSDCMRMEDTOKEN_GEN_ALL_CORE_FT
Albuterol (Eqv-ProAir HFA) 90 mcg/inh inhalation aerosol: 2 puff(s) inhaled every 6 hours, As Needed  benzonatate 200 mg oral capsule: 1 cap(s) orally 3 times a day, As Needed  levothyroxine 100 mcg (0.1 mg) oral tablet: 1 tab(s) orally 2 times a week  levothyroxine 88 mcg (0.088 mg) oral tablet: 1 tab(s) orally 5 times a week  magnesium oxide 400 mg oral tablet: 1 tab(s) orally once a day  metoclopramide 5 mg oral tablet: 1 tab(s) orally 4 times a day (before meals and at bedtime), As Needed  naproxen 500 mg oral tablet: 1 tab(s) orally 2 times a day, As Needed   Albuterol (Eqv-ProAir HFA) 90 mcg/inh inhalation aerosol: 2 puff(s) inhaled every 6 hours, As Needed  benzonatate 200 mg oral capsule: 1 cap(s) orally 3 times a day, As Needed  Ciprodex 0.3%-0.1% otic suspension: 4 drop(s) in each affected ear 2 times a day   levothyroxine 100 mcg (0.1 mg) oral tablet: 1 tab(s) orally 2 times a week  levothyroxine 88 mcg (0.088 mg) oral tablet: 1 tab(s) orally 5 times a week  magnesium oxide 400 mg oral tablet: 1 tab(s) orally once a day  meclizine 25 mg oral tablet: 1 tab(s) orally every 6 hours as needed for dizzines   metoclopramide 5 mg oral tablet: 1 tab(s) orally 4 times a day (before meals and at bedtime), As Needed  Multiple Vitamins oral tablet: 1 tab(s) orally once a day

## 2022-10-25 NOTE — DISCHARGE NOTE PROVIDER - NSDCCPCAREPLAN_GEN_ALL_CORE_FT
PRINCIPAL DISCHARGE DIAGNOSIS  Diagnosis: Vertigo  Assessment and Plan of Treatment: you presented to the hospital with sudden onset of dizziness (vertigo) and vomiting. Head CT scan shows no bleeding or acute findings. you were treated with IV hydration, zofran for nausea and vomiting, meclizine and and ciprodex ear drops. you were evaluated by neurologist, Dr. Barbosa.   Please follow up with your neurologist within the next two weeks for further evaluation and treatment plan. including obtaining more bloodwork including ESR, CRP, RF CCP  (basic rheumatology screening tests).   follow up with ENT outpatient for further studies and treatment plan.   Please complete the course of ear drops as prescribed.   Meclizine may cause drowsiness. do not drive or do tasks that require your alertness while taking this medication.  you may continue your prescribed regimen of Aimovig.     What is vertigo? Vertigo is a condition that causes you to feel dizzy. You may feel that you or everything around you is moving or spinning.   The inner ear is filled with fluid, a nerve, and small organs. These structures help you maintain your balance. Vertigo may be caused by diseases or conditions that affect your inner ear or the part of your brain that controls balance. ear trauma or an inner ear infection can cause vertigo.   Do not drive, walk without help, or operate heavy machinery when you are dizzy.  Move slowly when you move from one position to another position. Get up slowly from sitting or lying down. Sit or lie down right away if you feel dizzy.  Drink plenty of liquids. Liquids help prevent dehydration.         SECONDARY DISCHARGE DIAGNOSES  Diagnosis: Ear pain, bilateral  Assessment and Plan of Treatment: Please continue to take the ear drops as prescribed.   follow up with ENT for further care and treatment plan, especially if you continue to experience ear pain.    Diagnosis: Hypothyroidism  Assessment and Plan of Treatment: Please continue to take your medication as prescribed, and follow up with your PCP outpatient for further care and treatment.

## 2022-10-25 NOTE — DISCHARGE NOTE PROVIDER - NSFOLLOWUPCLINICS_GEN_ALL_ED_FT
[facility]  ENT  [address] [place] [address]  Phone: [phone]  Fax: [phone]  Follow Up Time: 2 weeks

## 2022-10-25 NOTE — DISCHARGE NOTE PROVIDER - HOSPITAL COURSE
52 year old female, PMH migraines, hypothyroidism, recent cholecystectomy (1 month ago), presents to the ED due to severe dizziness and nausea that started last night. Pt states she felt gradual onset of dizziness, described as room spinning. Pt admitted to medicine for new onset vertigo. associated with N/V, likely BPPV vs. vestibular neuritis vs. migraine-induced. pt states she had bronchitis last week and B/L ear pain that started 4 days ago. CTH: no acute abnormalities. started on start meclizine 25mg q6 standing, - IV Zofran 4mg PRN for nausea, gentle hydration IVF, Ciprodex, and Neuro Dr. Barbosa consulted. symptoms resolved, may follow up outpatient with neurologist in the next 2 weeks, out patient   Resolved episode of vertigo, nausea.  May have been first (known) episode of vertigo in association with a migraine attack.  Alternatively, she had an episode of BPPV (however there are no findings on exam at this time to support the Dx of BPPV).          RECOMMENDATIONS     Continue prescribed regimen of Aimovig.      Follow-up with her neurologist in ~the next 2 weeks.    Out-Pt ENT evaluation (hearing; vestibular function).      ESR, CRP, RF, CCP (basic rheum screening tests).        No further in-Pt neurologic evaluation or neuroimaging indicated at this time.  Defer to ENT and her neurologist for possible out-Pt studies.        pt eval reccs home with no needs. 52 year old female, PMH migraines, hypothyroidism, recent cholecystectomy (1 month ago), presents to the ED due to severe dizziness and nausea that started last night. Pt states she felt gradual onset of dizziness, described as room spinning. Pt admitted to medicine for new onset vertigo. associated with N/V, likely BPPV vs. vestibular neuritis vs. migraine-induced. pt states she had bronchitis last week and B/L ear pain that started 4 days ago. CTH: no acute abnormalities. started on start meclizine 25mg q6 standing, - IV Zofran 4mg PRN for nausea, gentle hydration IVF, Ciprodex, and Neuro Dr. Barbosa consulted. symptoms resolved, may follow up outpatient with neurologist in the next 2 weeks, out patient and out patient ENT evaluation for outpatient studies including ESR, CRP, RF, CCP.   Resolved episode of vertigo, nausea.  May have been first (known) episode of vertigo in association with a migraine attack.  Alternatively, she had an episode of BPPV (however there are no findings on exam at this time to support the Dx of BPPV).        Given patient's clinical status and current hemodynamic stability decision was made to discharge. Pt is stable for discharge per attending and is advised to f/u with PCP as out-patient. Please refer to Pt's complete medical chart with documents for a full hospital course, for this is only a brief summary.

## 2022-10-25 NOTE — DISCHARGE NOTE NURSING/CASE MANAGEMENT/SOCIAL WORK - PATIENT PORTAL LINK FT
You can access the FollowMyHealth Patient Portal offered by Health system by registering at the following website: [email or web address]. By joining [employer]’s FollowMyHealth portal, you will also be able to view your health information using other applications (apps) compatible with our system.

## 2023-04-12 ENCOUNTER — EMERGENCY (EMERGENCY)
Facility: HOSPITAL | Age: 53
LOS: 1 days | Discharge: HOME | End: 2023-04-12
Attending: EMERGENCY MEDICINE | Admitting: EMERGENCY MEDICINE
Payer: COMMERCIAL

## 2023-04-12 VITALS
TEMPERATURE: 98 F | DIASTOLIC BLOOD PRESSURE: 72 MMHG | HEART RATE: 75 BPM | OXYGEN SATURATION: 99 % | SYSTOLIC BLOOD PRESSURE: 130 MMHG | RESPIRATION RATE: 18 BRPM

## 2023-04-12 DIAGNOSIS — Z90.49 ACQUIRED ABSENCE OF OTHER SPECIFIED PARTS OF DIGESTIVE TRACT: Chronic | ICD-10-CM

## 2023-04-12 LAB
ALBUMIN SERPL ELPH-MCNC: 4.5 G/DL — SIGNIFICANT CHANGE UP (ref 3.3–5)
ALP SERPL-CCNC: 69 U/L — SIGNIFICANT CHANGE UP (ref 40–120)
ALT FLD-CCNC: 26 U/L — SIGNIFICANT CHANGE UP (ref 4–33)
ANION GAP SERPL CALC-SCNC: 10 MMOL/L — SIGNIFICANT CHANGE UP (ref 7–14)
APPEARANCE UR: CLEAR — SIGNIFICANT CHANGE UP
AST SERPL-CCNC: 17 U/L — SIGNIFICANT CHANGE UP (ref 4–32)
BACTERIA # UR AUTO: NEGATIVE — SIGNIFICANT CHANGE UP
BASOPHILS # BLD AUTO: 0.04 K/UL — SIGNIFICANT CHANGE UP (ref 0–0.2)
BASOPHILS NFR BLD AUTO: 0.5 % — SIGNIFICANT CHANGE UP (ref 0–2)
BILIRUB SERPL-MCNC: 0.5 MG/DL — SIGNIFICANT CHANGE UP (ref 0.2–1.2)
BILIRUB UR-MCNC: NEGATIVE — SIGNIFICANT CHANGE UP
BLOOD GAS VENOUS COMPREHENSIVE RESULT: SIGNIFICANT CHANGE UP
BUN SERPL-MCNC: 10 MG/DL — SIGNIFICANT CHANGE UP (ref 7–23)
CALCIUM SERPL-MCNC: 9.9 MG/DL — SIGNIFICANT CHANGE UP (ref 8.4–10.5)
CHLORIDE SERPL-SCNC: 105 MMOL/L — SIGNIFICANT CHANGE UP (ref 98–107)
CO2 SERPL-SCNC: 26 MMOL/L — SIGNIFICANT CHANGE UP (ref 22–31)
COLOR SPEC: SIGNIFICANT CHANGE UP
CREAT SERPL-MCNC: 0.6 MG/DL — SIGNIFICANT CHANGE UP (ref 0.5–1.3)
DIFF PNL FLD: NEGATIVE — SIGNIFICANT CHANGE UP
EGFR: 107 ML/MIN/1.73M2 — SIGNIFICANT CHANGE UP
EOSINOPHIL # BLD AUTO: 0.29 K/UL — SIGNIFICANT CHANGE UP (ref 0–0.5)
EOSINOPHIL NFR BLD AUTO: 3.4 % — SIGNIFICANT CHANGE UP (ref 0–6)
EPI CELLS # UR: 1 /HPF — SIGNIFICANT CHANGE UP (ref 0–5)
GLUCOSE SERPL-MCNC: 106 MG/DL — HIGH (ref 70–99)
GLUCOSE UR QL: NEGATIVE — SIGNIFICANT CHANGE UP
HCT VFR BLD CALC: 39.7 % — SIGNIFICANT CHANGE UP (ref 34.5–45)
HGB BLD-MCNC: 12.8 G/DL — SIGNIFICANT CHANGE UP (ref 11.5–15.5)
HYALINE CASTS # UR AUTO: 0 /LPF — SIGNIFICANT CHANGE UP (ref 0–7)
IANC: 5.12 K/UL — SIGNIFICANT CHANGE UP (ref 1.8–7.4)
IMM GRANULOCYTES NFR BLD AUTO: 0.2 % — SIGNIFICANT CHANGE UP (ref 0–0.9)
KETONES UR-MCNC: ABNORMAL
LEUKOCYTE ESTERASE UR-ACNC: ABNORMAL
LIDOCAIN IGE QN: 26 U/L — SIGNIFICANT CHANGE UP (ref 7–60)
LYMPHOCYTES # BLD AUTO: 2.4 K/UL — SIGNIFICANT CHANGE UP (ref 1–3.3)
LYMPHOCYTES # BLD AUTO: 28.5 % — SIGNIFICANT CHANGE UP (ref 13–44)
MAGNESIUM SERPL-MCNC: 2.2 MG/DL — SIGNIFICANT CHANGE UP (ref 1.6–2.6)
MCHC RBC-ENTMCNC: 27.9 PG — SIGNIFICANT CHANGE UP (ref 27–34)
MCHC RBC-ENTMCNC: 32.2 GM/DL — SIGNIFICANT CHANGE UP (ref 32–36)
MCV RBC AUTO: 86.7 FL — SIGNIFICANT CHANGE UP (ref 80–100)
MONOCYTES # BLD AUTO: 0.54 K/UL — SIGNIFICANT CHANGE UP (ref 0–0.9)
MONOCYTES NFR BLD AUTO: 6.4 % — SIGNIFICANT CHANGE UP (ref 2–14)
NEUTROPHILS # BLD AUTO: 5.12 K/UL — SIGNIFICANT CHANGE UP (ref 1.8–7.4)
NEUTROPHILS NFR BLD AUTO: 61 % — SIGNIFICANT CHANGE UP (ref 43–77)
NITRITE UR-MCNC: NEGATIVE — SIGNIFICANT CHANGE UP
NRBC # BLD: 0 /100 WBCS — SIGNIFICANT CHANGE UP (ref 0–0)
NRBC # FLD: 0 K/UL — SIGNIFICANT CHANGE UP (ref 0–0)
PH UR: 7 — SIGNIFICANT CHANGE UP (ref 5–8)
PHOSPHATE SERPL-MCNC: 4.2 MG/DL — SIGNIFICANT CHANGE UP (ref 2.5–4.5)
PLATELET # BLD AUTO: 368 K/UL — SIGNIFICANT CHANGE UP (ref 150–400)
POTASSIUM SERPL-MCNC: 4 MMOL/L — SIGNIFICANT CHANGE UP (ref 3.5–5.3)
POTASSIUM SERPL-SCNC: 4 MMOL/L — SIGNIFICANT CHANGE UP (ref 3.5–5.3)
PROT SERPL-MCNC: 7.8 G/DL — SIGNIFICANT CHANGE UP (ref 6–8.3)
PROT UR-MCNC: NEGATIVE — SIGNIFICANT CHANGE UP
RBC # BLD: 4.58 M/UL — SIGNIFICANT CHANGE UP (ref 3.8–5.2)
RBC # FLD: 13 % — SIGNIFICANT CHANGE UP (ref 10.3–14.5)
RBC CASTS # UR COMP ASSIST: 4 /HPF — SIGNIFICANT CHANGE UP (ref 0–4)
SODIUM SERPL-SCNC: 141 MMOL/L — SIGNIFICANT CHANGE UP (ref 135–145)
SP GR SPEC: 1.01 — SIGNIFICANT CHANGE UP (ref 1.01–1.05)
UROBILINOGEN FLD QL: SIGNIFICANT CHANGE UP
WBC # BLD: 8.41 K/UL — SIGNIFICANT CHANGE UP (ref 3.8–10.5)
WBC # FLD AUTO: 8.41 K/UL — SIGNIFICANT CHANGE UP (ref 3.8–10.5)
WBC UR QL: 8 /HPF — HIGH (ref 0–5)

## 2023-04-12 PROCEDURE — 99284 EMERGENCY DEPT VISIT MOD MDM: CPT

## 2023-04-12 RX ORDER — ONDANSETRON 8 MG/1
4 TABLET, FILM COATED ORAL ONCE
Refills: 0 | Status: COMPLETED | OUTPATIENT
Start: 2023-04-12 | End: 2023-04-12

## 2023-04-12 RX ORDER — SODIUM CHLORIDE 9 MG/ML
1000 INJECTION INTRAMUSCULAR; INTRAVENOUS; SUBCUTANEOUS ONCE
Refills: 0 | Status: COMPLETED | OUTPATIENT
Start: 2023-04-12 | End: 2023-04-12

## 2023-04-12 RX ADMIN — Medication 0.5 MILLIGRAM(S): at 19:03

## 2023-04-12 RX ADMIN — ONDANSETRON 4 MILLIGRAM(S): 8 TABLET, FILM COATED ORAL at 18:51

## 2023-04-12 RX ADMIN — SODIUM CHLORIDE 1000 MILLILITER(S): 9 INJECTION INTRAMUSCULAR; INTRAVENOUS; SUBCUTANEOUS at 18:51

## 2023-04-12 NOTE — ED ADULT NURSE NOTE - NSHOSCREENINGQ1_ED_ALL_ED
Feeding:  no  Last Menstrual period: 50's  Oral Contraceptives: yes     Number of years: 3  Hormone Replacement therapy no     Number of Years:   Last Pap Smear: aug 19 vaginal biopsy done  Last Mammogram sept 2022    A 10-point review of systems  has been conducted and pertinent positives have been   recorded. All other review of systems are negative    Was the patient admitted during the course of treatment OR within 30 days of treatment?  N/a    Additional Comments: Saw Dr Rosendo Zhang, would like to start chemo next week    Isabela Echevarria 15    Patient Scenarios               (Score 1 Point Each)  The Patient has  A chronic illness with uncontrolled symptoms (e.g. pain, nausea, vomiting, shortness of breath, anorexia)   []  Unresolved psychosocial or spiritual issues                                                                                                     []  Frequent visits to the Emergency Department  (>1 x per month or >2 in last 3 months)                                 []  More than one hospital admission in past 90 days                  []   Complex care requirements (e.g. functional dependency, complex home support for ventilator/antibiotics/feedings, patient in SNF/LTAC/nursing home) []  No advance directives in place                                                                                                                         [x]  TOTAL FOR SECTION #1- 1      Basic Disease Processes             (Score 3 Points Overall)  Locally advanced or metastatic cancer           [x]  Non-cancer life-limiting illness (COPD, CHF, advanced dementia, stroke)      []  Total score for section # 2-  3        Concomitant Disease Processes            (Score 1 Point Overall)  COPD               []   Renal Disease              []  CHF               []  Liver Disease              []  Other significant comorbidities (e.g. failure to thrive, feeding intolerance, functional decline)    []  Total score for section # 3- 0      Physician to complete #4, #5, & #6      Symptom Assessment             (Score 1 Point Each)  Severe/Uncontrolled Pain (e.g. patients who are opiate naïve and/or only taking OTC medications OR patients prescribed opiate by non-palliative care provider and pain not adequately controlled OR patient on long-term opiates for chronic pain and high risk for tolerance/abuse) [x]  Anorexia/failure to thrive            []  Unintentional weight loss of greater than 10 lbs in 1 month       []  Shortness of breath/increasing O2 needs via supplemental source      []  Cognitive impairment            []  Total score for section # 4- 1      Functional Status of Patient  ECOG 2              [x] (Score 2 Point)  ECOG 3             [] (Score 3 Points)  Total score for section # 5- 2      Goals of Care              (Score 6 Points)  If patient wishes to pursue hospice and/or wishes to have modifications in their goals of care that involve stopping active treatment. []    Total score for section # 6- 0    TOTAL SCORE > 6 Consult Palliative Care (requires provider orders in Epic  Total score- 7            HEREDITARY PROSTATE CANCER ASSESSMENT SCREEN          [x] This screening is not applicable to the patient.   [] Metastatic Prostate Cancer  [] High/Very high risk prostate cancer  [] [place] Score of 7 & of the following:   [] 1 Family member with metastatic prostate cancer   [] 1 Family member with ovarian cancer   [] 1 Family member with pancreatic cancer   [] 1 Familly member with breast cancer 48 or younger   [] 2 Family members on the same side of the family with breast or prostate cancer at any age   [] [address]  [] Physician notified of any positive answers  [] Assessment negative No

## 2023-04-12 NOTE — ED ADULT NURSE NOTE - NSIMPLEMENTINTERV_GEN_ALL_ED
Implemented All Universal Safety Interventions:  [place] to call system. Call bell, personal items and telephone within reach. Instruct patient to call for assistance. Room bathroom lighting operational. Non-slip footwear when patient is off stretcher. Physically safe environment: no spills, clutter or unnecessary equipment. Stretcher in lowest position, wheels locked, appropriate side rails in place. Implemented All Universal Safety Interventions:  [place] to call system. Call bell, personal items and telephone within reach. Instruct patient to call for assistance. Room bathroom lighting operational. Non-slip footwear when patient is off stretcher. Physically safe environment: no spills, clutter or unnecessary equipment. Stretcher in lowest position, wheels locked, appropriate side rails in place. Implemented All Universal Safety Interventions:  [place] to call system. Call bell, personal items and telephone within reach. Instruct patient to call for assistance. Room bathroom lighting operational. Non-slip footwear when patient is off stretcher. Physically safe environment: no spills, clutter or unnecessary equipment. Stretcher in lowest position, wheels locked, appropriate side rails in place.

## 2023-04-12 NOTE — ED ADULT TRIAGE NOTE - CHIEF COMPLAINT QUOTE
pt brought in by EMS complaining of nausea and abdominal pain x 1 month. Pt states she is unable to tolerate h pylori meds.  Pt denies chest pain, sob, fever or chills.

## 2023-04-12 NOTE — ED PROVIDER NOTE - CLINICAL SUMMARY MEDICAL DECISION MAKING FREE TEXT BOX
52-year-old female history of H. pylori, anxiety, believe that she can only take homeopathic medications, presents with son for 1 month nausea and decreased p.o. significant finding on exam: Nontender abdomen, dry mucous membranes, very anxious affect.  Differential includes, but not limited to: H. pylori gastritis, GERD, gastric ulcer pancreatitis, gastroparesis secondary to anxiety, electrolyte abnormalities secondary to decreased p.o., UTI.  Will obtain abdominal labs IV fluids, antiemetic, benzodiazepine for anxiety.  anticipate outpatient follow-up. Andrae att: 52-year-old female history of H. pylori, anxiety, believe that she can only take homeopathic medications, presents with son for 1 month nausea and decreased p.o. significant finding on exam: Nontender abdomen, dry mucous membranes, very anxious affect.  Differential includes, but not limited to: H. pylori gastritis, GERD, gastric ulcer pancreatitis, gastroparesis secondary to anxiety, electrolyte abnormalities secondary to decreased p.o., UTI.  Will obtain abdominal labs IV fluids, antiemetic, benzodiazepine for anxiety.  anticipate outpatient follow-up.

## 2023-04-12 NOTE — ED PROVIDER NOTE - PHYSICAL EXAMINATION
GEN: Patient awake alert NAD.   HEENT: normocephalic, atraumatic, EOMI, no scleral icterus, + dry MM  CARDIAC: RRR, S1, S2, no murmur.   PULM: CTA B/L no wheeze, rhonchi, rales.   ABD: soft NT, ND, no rebound no guarding  MSK: Moving all extremities, no edema. 5/5 strength and full ROM in all extremities.     NEURO: A&Ox3, gait normal, no focal neurological deficits, CN 2-12 grossly intact  SKIN: warm, dry, no rash.  psych: + anxious affect

## 2023-04-12 NOTE — ED PROVIDER NOTE - PROGRESS NOTE DETAILS
SHANTI Carrillo- Pt received at sign out pending labs. Labs stable. UA pending. Pt passed PO challenge. Wishes to be discharge. All questions and concerns addressed. Strict return instructions given. No complaints noted. [facility] psych crisis center referral given. SHANTI Carrillo- Pt received at sign out pending labs. Labs stable. UA pending. Pt passed PO challenge. Wishes to be discharge. All questions and concerns addressed. Strict return instructions given. No complaints noted. [facility] psych crisis center referral given. SHANTI Carrillo- Pt received at sign out pending labs. Labs stable. UA pending. Pt passed PO challenge. Wishes to be discharge. All questions and concerns addressed. Strict return instructions given. No complaints noted. [facility] psych crisis center referral given.

## 2023-04-12 NOTE — ED ADULT NURSE NOTE - OBJECTIVE STATEMENT
A&Ox4. ambulatory. son at bedside. c/o ABD pain and nausea for one month. NAD. pt denies SOB, chest pain, dizziness, weakness, urinary symptoms, HA, , fevers, chills. respirations are even and un labored. skin intact. 20g placed to LAC. labs drawn and sent. safety precautions maintained.

## 2023-04-12 NOTE — ED PROVIDER NOTE - OBJECTIVE STATEMENT
52-year-old female history of H. pylori, anxiety, believe that she can only take homeopathic medications, presents with son for 1 month nausea and decreased p.o. patient and son states that she has lost "20 pounds" from not eating in the last month.     Son divulged outside of the room that he started giving patient amoxicillin, clarithromycin, metronidazole today in lieu of her homeopathic medication.  Patient does not know that she is taking these medications.  Son reports patient started vomiting, so he got concerned and brought her in.  No vomiting noted in the ED

## 2023-04-12 NOTE — ED PROVIDER NOTE - PATIENT PORTAL LINK FT
You can access the FollowMyHealth Patient Portal offered by [facility] by registering at the following website: [email or web address]. By joining [employer]’s FollowMyHealth portal, you will also be able to view your health information using other applications (apps) compatible with our system. You can access the FollowMyHealth Patient Portal offered by [facility] by registering at the following website: [email or web address]. By joining [employer]’s FollowMyHealth portal, you will also be able to view your health information using other applications (apps) compatible with our system. You can access the FollowMyHealth Patient Portal offered by [facility] by registering at the following website: [email or web address]. By joining [employer]’s FollowMyHealth portal, you will also be able to view your health information using other applications (apps) compatible with our system.

## 2023-04-12 NOTE — ED PROVIDER NOTE - ATTENDING APP SHARED VISIT CONTRIBUTION OF CARE
normal...
I performed a history and physical exam of the patient and discussed their management with the advanced care provider. I reviewed the advanced care provider's note and agree with the documented findings and plan of care. My medical decision making and objective findings are found above.

## 2023-04-13 LAB
CULTURE RESULTS: NO GROWTH — SIGNIFICANT CHANGE UP
SPECIMEN SOURCE: SIGNIFICANT CHANGE UP

## 2023-12-12 PROBLEM — E03.9 HYPOTHYROIDISM, UNSPECIFIED: Chronic | Status: ACTIVE | Noted: 2022-10-23

## 2025-07-31 NOTE — DISCHARGE NOTE PROVIDER - NSDCCAREPROVSEEN_GEN_ALL_CORE_FT
Clinic Care Coordination Contact  [facility]/Voicemail    Марина Short, RN to Me 7/17/25 10:48 AM  Okay to dis-enroll.  Please notify her PCP     Me to Марина Short, RADHA 7/17/25 10:26 AM  I have called patient x2 with no return call. Please review chart for disenrollment or if I should make 1 more attempt. Thank you.    Plan: Care Coordinator will send disenrollment letter with care coordinator contact information via [facility]. Care Coordinator will do no further outreaches at this time.    Kirstin Rodriguez  Community Health Worker  [facility] Care Coordination   [place], [facility], [place], [place] and [facility]  Office: [phone]        Shannan Newman 25